# Patient Record
Sex: FEMALE | Race: WHITE | Employment: UNEMPLOYED | URBAN - METROPOLITAN AREA
[De-identification: names, ages, dates, MRNs, and addresses within clinical notes are randomized per-mention and may not be internally consistent; named-entity substitution may affect disease eponyms.]

---

## 2023-01-15 ENCOUNTER — HOSPITAL ENCOUNTER (EMERGENCY)
Age: 23
Discharge: HOME | End: 2023-01-15
Attending: EMERGENCY MEDICINE
Payer: MEDICAID

## 2023-01-15 ENCOUNTER — APPOINTMENT (OUTPATIENT)
Dept: GENERAL RADIOLOGY | Age: 23
End: 2023-01-15
Payer: MEDICAID

## 2023-01-15 VITALS
HEIGHT: 68 IN | WEIGHT: 120 LBS | RESPIRATION RATE: 17 BRPM | OXYGEN SATURATION: 96 % | SYSTOLIC BLOOD PRESSURE: 108 MMHG | HEART RATE: 82 BPM | TEMPERATURE: 97.9 F | BODY MASS INDEX: 18.19 KG/M2 | DIASTOLIC BLOOD PRESSURE: 63 MMHG

## 2023-01-15 DIAGNOSIS — J20.9 ACUTE BRONCHITIS, UNSPECIFIED ORGANISM: Primary | ICD-10-CM

## 2023-01-15 PROCEDURE — 71046 X-RAY EXAM CHEST 2 VIEWS: CPT

## 2023-01-15 PROCEDURE — 99283 EMERGENCY DEPT VISIT LOW MDM: CPT

## 2023-01-15 RX ORDER — METHYLPREDNISOLONE 4 MG/1
TABLET ORAL
Qty: 1 KIT | Refills: 0 | Status: SHIPPED | OUTPATIENT
Start: 2023-01-15 | End: 2023-01-15 | Stop reason: SDUPTHER

## 2023-01-15 RX ORDER — METHYLPREDNISOLONE 4 MG/1
TABLET ORAL
Qty: 1 KIT | Refills: 0 | Status: SHIPPED | OUTPATIENT
Start: 2023-01-15 | End: 2023-01-21

## 2023-01-15 ASSESSMENT — ENCOUNTER SYMPTOMS
VOMITING: 0
NAUSEA: 0
DIARRHEA: 0
RHINORRHEA: 0
ABDOMINAL DISTENTION: 0
SHORTNESS OF BREATH: 0
COUGH: 1
CHEST TIGHTNESS: 0
BACK PAIN: 0
SORE THROAT: 0
EYE REDNESS: 0

## 2023-01-15 ASSESSMENT — PAIN SCALES - GENERAL: PAINLEVEL_OUTOF10: 0

## 2023-01-15 ASSESSMENT — PAIN - FUNCTIONAL ASSESSMENT: PAIN_FUNCTIONAL_ASSESSMENT: 0-10

## 2023-01-15 NOTE — ED NOTES
I have reviewed discharge instructions with the patient. The patient verbalized understanding. Patient left ED via Discharge Method: ambulatory to Home with family. Opportunity for questions and clarification provided. Patient given 1 scripts. To continue your aftercare when you leave the hospital, you may receive an automated call from our care team to check in on how you are doing. This is a free service and part of our promise to provide the best care and service to meet your aftercare needs.  If you have questions, or wish to unsubscribe from this service please call [phone]. Thank you for Choosing our [facility] Emergency Department.         Barby Mendoza RN  01/15/23 [id number]

## 2023-01-15 NOTE — ED PROVIDER NOTES
Emergency Department Provider Note                   PCP:                None Provider               Age: 23 y.o. Sex: female       ICD-10-CM    1. Acute bronchitis, unspecified organism  J20.9           DISPOSITION Decision To Discharge 01/15/2023 01:44:27 PM        Medical Decision Making  Is a 82-year-old female presenting with persistent cough at times productive for the last 3 weeks. Her younger son has been sick with similar symptoms and was recently diagnosed with bronchitis. She states she had bronchitis a few months ago and believes she broke a rib from the coughing. She does have pain on the left lateral ribs however denies any fever chills or shortness of breath. She is afebrile, nontoxic in appearance, vital signs within appropriate limits, lungs clear to auscultation throughout. Due to duration of cough and previous history will get chest x-ray. X-ray does not show any acute airspace disease. All results discussed at length with patient. Will DC with prescription for Medrol Dosepak and advised over-the-counter cough and cold medication as needed. Do not see any need for antibiotics at this time. Discussed follow-up as well as reasons to return to the ED. Patient agreeable to plan. Amount and/or Complexity of Data Reviewed  Radiology: ordered. Decision-making details documented in ED Course. Risk  OTC drugs. Prescription drug management. Orders Placed This Encounter   Procedures    XR CHEST (2 VW)        Medications - No data to display    Discharge Medication List as of 1/15/2023  1:51 PM           Wes Jeffries is a 23 y.o. female who presents to the Emergency Department with chief complaint of    Chief Complaint   Patient presents with    Cough      82-year-old female who presents with complaint of persistent cough for the last 3 weeks. Was productive but is not anymore.   She states her son has been sick with similar symptoms and was just recently diagnosed with bronchitis that she assumed she has the same thing. She notes that she had bronchitis few months ago and coughed so much that she cracked a rib on the left side. She states that she has been having pain in the left lower ribs the same area that she did before. She denies any fever or chills. She denies any sore throat, congestion, shortness of breath or difficulty breathing. The history is provided by the patient. Review of Systems   Constitutional:  Negative for activity change, appetite change, chills, fatigue and fever. HENT:  Negative for congestion, ear pain, rhinorrhea and sore throat. Eyes:  Negative for redness. Respiratory:  Positive for cough. Negative for chest tightness and shortness of breath. Cardiovascular:  Negative for chest pain. Gastrointestinal:  Negative for abdominal distention, diarrhea, nausea and vomiting. Musculoskeletal:  Negative for back pain and neck pain. Skin:  Negative for rash. Neurological:  Negative for light-headedness and headaches. Psychiatric/Behavioral:  Negative for confusion. History reviewed. No pertinent past medical history. History reviewed. No pertinent surgical history. History reviewed. No pertinent family history. Social History     Socioeconomic History    Marital status: Single     Spouse name: None    Number of children: None    Years of education: None    Highest education level: None   Tobacco Use    Smoking status: Never    Smokeless tobacco: Never   Substance and Sexual Activity    Alcohol use: Not Currently    Drug use: Never         Augmentin [amoxicillin-pot clavulanate] and Sulfa antibiotics     Discharge Medication List as of 1/15/2023  1:51 PM           Vitals signs and nursing note reviewed.    Patient Vitals for the past 4 hrs:   Temp Pulse Resp BP SpO2   01/15/23 1301 -- -- 17 -- --   01/15/23 1253 97.9 °F (36.6 °C) 82 -- 108/63 96 %          Physical Exam  Vitals and nursing note reviewed. Constitutional:       General: She is not in acute distress. Appearance: She is not ill-appearing or toxic-appearing. HENT:      Head: Normocephalic and atraumatic. Cardiovascular:      Rate and Rhythm: Normal rate. Pulses: Normal pulses. Pulmonary:      Effort: Pulmonary effort is normal.      Breath sounds: Normal breath sounds. Chest:      Chest wall: Tenderness (minimally ttp of the left lower lateral rib) present. Skin:     General: Skin is warm and dry. Neurological:      General: No focal deficit present. Mental Status: She is alert and oriented to person, place, and time. Psychiatric:         Mood and Affect: Mood normal.         Behavior: Behavior normal.        Procedures    Results for orders placed or performed during the hospital encounter of 01/15/23   XR CHEST (2 VW)    Narrative    EXAMINATION: XR CHEST (2 VW) 1/15/2023 1:28 PM    ACCESSION NUMBER: [id number]    COMPARISON: None available    INDICATION: left sided rib pain, sob    TECHNIQUE: PA and lateral views of the chest were obtained. FINDINGS:   No focal consolidation. No pulmonary edema. No pneumothorax or sizable pleural effusion. Unremarkable cardiomediastinal silhouette. No displaced fracture seen. Impression    No acute airspace disease. XR CHEST (2 VW)   Final Result   No acute airspace disease. Voice dictation software was used during the making of this note. This software is not perfect and grammatical and other typographical errors may be present. This note has not been completely proofread for errors.      [place], [place]  01/15/23 5514

## 2023-01-15 NOTE — ED TRIAGE NOTES
Pt reports has been sick for 3 wks w persistent cough.  Pt has hx bronchitis 2months ago and broke rib L side from coughing (-)fevers, chills, earaches (+)sore throat, slight dyspnea  Pt's son dx w bronchitis   A&Ox4